# Patient Record
Sex: FEMALE | Race: WHITE
[De-identification: names, ages, dates, MRNs, and addresses within clinical notes are randomized per-mention and may not be internally consistent; named-entity substitution may affect disease eponyms.]

---

## 2020-02-07 ENCOUNTER — NURSE TRIAGE (OUTPATIENT)
Dept: OTHER | Facility: CLINIC | Age: 40
End: 2020-02-07

## 2021-08-03 ENCOUNTER — HOSPITAL ENCOUNTER (OUTPATIENT)
Age: 41
Discharge: HOME | End: 2021-08-05

## 2021-08-03 PROCEDURE — 88305 TISSUE EXAM BY PATHOLOGIST: CPT

## 2021-12-28 ENCOUNTER — NURSE TRIAGE (OUTPATIENT)
Dept: OTHER | Facility: CLINIC | Age: 41
End: 2021-12-28

## 2021-12-29 NOTE — TELEPHONE ENCOUNTER
Subjective: Caller states she had COVID at the end of November has been better for a couple weeks but now she has developed a cough yesterday that is getting worse. Does go through some coughing spells where she cant stop. Chest and lung pain present only when coughing. Denies fever. Current Symptoms: Cough    Onset: 2 days ago; sudden    Associated Symptoms: NA    Pain Severity: 7/10; sharp; intermittent    Temperature: Denies fever    What has been tried: Stiven    Recommended disposition: See provider within 24 hours    Care advice provided, patient verbalizes understanding; denies any other questions or concerns; instructed to call back for any new or worsening symptoms. Patient/caller to follow-up with PCP     This triage is a result of a call to [address] [place] [address]. Please do not respond to the triage nurse through this encounter. Any subsequent communication should be directly with the patient.     Had covid at the end of november has been better for a couple weeks but now she has developed a cough yesterday    Reason for Disposition   SEVERE coughing spells (e.g., whooping sound after coughing, vomiting after coughing)    Protocols used: COUGH - ACUTE PRODUCTIVE-ADULT-AH